# Patient Record
Sex: FEMALE | NOT HISPANIC OR LATINO | URBAN - METROPOLITAN AREA
[De-identification: names, ages, dates, MRNs, and addresses within clinical notes are randomized per-mention and may not be internally consistent; named-entity substitution may affect disease eponyms.]

---

## 2017-07-19 ENCOUNTER — OFFICE VISIT - HEALTHEAST (OUTPATIENT)
Dept: FAMILY MEDICINE | Facility: CLINIC | Age: 37
End: 2017-07-19

## 2017-07-19 DIAGNOSIS — N39.0 UTI (URINARY TRACT INFECTION): ICD-10-CM

## 2017-07-19 DIAGNOSIS — R35.0 URINARY FREQUENCY: ICD-10-CM

## 2017-07-25 ENCOUNTER — COMMUNICATION - HEALTHEAST (OUTPATIENT)
Dept: FAMILY MEDICINE | Facility: CLINIC | Age: 37
End: 2017-07-25

## 2021-03-24 ENCOUNTER — COMMUNICATION - HEALTHEAST (OUTPATIENT)
Dept: SCHEDULING | Facility: CLINIC | Age: 41
End: 2021-03-24

## 2021-03-25 ENCOUNTER — COMMUNICATION - HEALTHEAST (OUTPATIENT)
Dept: CARE COORDINATION | Facility: CLINIC | Age: 41
End: 2021-03-25

## 2021-03-25 ENCOUNTER — COMMUNICATION - HEALTHEAST (OUTPATIENT)
Dept: SCHEDULING | Facility: CLINIC | Age: 41
End: 2021-03-25

## 2021-03-26 ENCOUNTER — OFFICE VISIT - HEALTHEAST (OUTPATIENT)
Dept: INTERNAL MEDICINE | Facility: CLINIC | Age: 41
End: 2021-03-26

## 2021-03-26 DIAGNOSIS — J45.21 EXACERBATION OF INTERMITTENT ASTHMA, UNSPECIFIED ASTHMA SEVERITY: ICD-10-CM

## 2021-03-26 RX ORDER — BUDESONIDE AND FORMOTEROL FUMARATE DIHYDRATE 80; 4.5 UG/1; UG/1
2 AEROSOL RESPIRATORY (INHALATION) 2 TIMES DAILY
Qty: 1 INHALER | Refills: 1 | Status: SHIPPED | OUTPATIENT
Start: 2021-03-26

## 2021-03-26 ASSESSMENT — PATIENT HEALTH QUESTIONNAIRE - PHQ9: SUM OF ALL RESPONSES TO PHQ QUESTIONS 1-9: 0

## 2021-03-27 ENCOUNTER — COMMUNICATION - HEALTHEAST (OUTPATIENT)
Dept: CARE COORDINATION | Facility: CLINIC | Age: 41
End: 2021-03-27

## 2021-03-29 ENCOUNTER — AMBULATORY - HEALTHEAST (OUTPATIENT)
Dept: INTERNAL MEDICINE | Facility: CLINIC | Age: 41
End: 2021-03-29

## 2021-03-29 ENCOUNTER — COMMUNICATION - HEALTHEAST (OUTPATIENT)
Dept: NURSING | Facility: CLINIC | Age: 41
End: 2021-03-29

## 2021-04-01 ENCOUNTER — COMMUNICATION - HEALTHEAST (OUTPATIENT)
Dept: CARE COORDINATION | Facility: CLINIC | Age: 41
End: 2021-04-01

## 2021-04-01 ASSESSMENT — ACTIVITIES OF DAILY LIVING (ADL): DEPENDENT_IADLS:: INDEPENDENT

## 2021-04-05 ENCOUNTER — COMMUNICATION - HEALTHEAST (OUTPATIENT)
Dept: CARE COORDINATION | Facility: CLINIC | Age: 41
End: 2021-04-05

## 2021-04-09 ENCOUNTER — COMMUNICATION - HEALTHEAST (OUTPATIENT)
Dept: CARE COORDINATION | Facility: CLINIC | Age: 41
End: 2021-04-09

## 2021-04-16 ENCOUNTER — COMMUNICATION - HEALTHEAST (OUTPATIENT)
Dept: NURSING | Facility: CLINIC | Age: 41
End: 2021-04-16

## 2021-04-30 ENCOUNTER — COMMUNICATION - HEALTHEAST (OUTPATIENT)
Dept: NURSING | Facility: CLINIC | Age: 41
End: 2021-04-30

## 2021-05-11 ENCOUNTER — COMMUNICATION - HEALTHEAST (OUTPATIENT)
Dept: NURSING | Facility: CLINIC | Age: 41
End: 2021-05-11

## 2021-05-27 ASSESSMENT — PATIENT HEALTH QUESTIONNAIRE - PHQ9: SUM OF ALL RESPONSES TO PHQ QUESTIONS 1-9: 0

## 2021-05-31 VITALS — WEIGHT: 157 LBS

## 2021-06-11 NOTE — PROGRESS NOTES
Chief Complaint   Patient presents with     poss uti     Discomfort after urinating to the bladder. Frequency.        HPI    Patient is here for a week of dysuria with increased urinary frequency, with recent small gross hematuria. No fever, chills,flank pain, abdominal pain, hx of renal calculus.     ROS: Pertinent ROS noted in HPI.     No Known Allergies  There is no problem list on file for this patient.      No family history on file.    Social History     Social History     Marital status: Single     Spouse name: N/A     Number of children: N/A     Years of education: N/A     Occupational History     Not on file.     Social History Main Topics     Smoking status: Never Smoker     Smokeless tobacco: Never Used     Alcohol use Not on file     Drug use: Not on file     Sexual activity: Not on file     Other Topics Concern     Not on file     Social History Narrative     No narrative on file         Objective:    Vitals:    07/19/17 1806   BP: 114/60   Pulse: 75   Resp: 18   Temp: 98.6  F (37  C)   SpO2: 98%       Gen:NAD  Abd: normal bowel sounds, soft, no tenderness, no HSM/mass. No CVA tenderness.       Recent Results (from the past 24 hour(s))   Urinalysis-UC if Indicated   Result Value Ref Range    Color, UA Yellow Colorless, Yellow, Straw, Light Yellow    Clarity, UA Cloudy (!) Clear    Glucose, UA Negative Negative    Bilirubin, UA Negative Negative    Ketones, UA Negative Negative    Specific Gravity, UA >=1.030 1.005 - 1.030    Blood, UA Large (!) Negative    pH, UA 5.5 5.0 - 8.0    Protein,  mg/dL (!) Negative mg/dL    Urobilinogen, UA 0.2 E.U./dL 0.2 E.U./dL, 1.0 E.U./dL    Nitrite, UA Negative Negative    Leukocytes, UA Large (!) Negative    Bacteria, UA Few (!) None Seen hpf    RBC, UA 25-50 (!) None Seen, 0-2 hpf    WBC, UA  (!) None Seen, 0-5 hpf    Squam Epithel, UA 0-5 None Seen, 0-5 lpf    WBC Clumps Present (!) None Seen         UTI (urinary tract infection)  -     nitrofurantoin,  macrocrystal-monohydrate, (MACROBID) 100 MG capsule; Take 1 capsule (100 mg total) by mouth 2 (two) times a day for 7 days.    Urinary frequency  -     Urinalysis-UC if Indicated  -     Culture, Urine

## 2021-06-16 PROBLEM — J45.21 EXACERBATION OF INTERMITTENT ASTHMA: Status: ACTIVE | Noted: 2021-03-26

## 2021-06-16 NOTE — PROGRESS NOTES
Pt on [employer] who was seen in ED yesterday for wheezing and cough. COVID test negative. Call made to pt for ED f/u and pt reporting O2 sats 86% to the Loop. During call, pt needed to take incoming call; RN will call back later.    Care Coordination ED Discharge Follow up Note    ED Discharge date: 3/24/21         Reason/Diagnosis for ED visit: wheezing, cough. COVID negative         Are you feeling better, the same, or worse since your ED visit: Better. Wheezing and cough improved. Denies fever, chest tightness.                       Were you sent home with oxygen and a pulse oximeter: Yes oximeter    Are you currently utilizing the oxygen?  N/A    Are you currently utilizing the pulse oximeter: Yes    Do you understand how to utilize both: Yes  oximeter  What liter flow were you instructed to use? N/A  What liter flow are you using to maintain your oxygen saturation: N/A    What type of home oxygen system do you have (*ask about portability and ability to manage a portable oxygen delivery)?  N/A    Who is your supply company? N/A    What are your current oxygen saturations: yesterday 84-88% w/o dyspnea. Today 99%     If red flag o2 sat, escalated to:    Activity:         How much activity can you do before you are SOB? Pt denies dyspnea.    Have you had to reduce your activities because of dyspnea or other symptoms? No. Pt is working from home.         Follow up:         Do you have any follow up appointments scheduled with your PCP or a specialist? No    Who are you seeing and when is it scheduled: Clinic will call her to fit her in the [facility] schedule for tomorrow    Do you feel like you have a plan in place in the event of an emergency? Yes    Provided patient with .[id number] via "[employer]"hart or email:  No      RN Notes:  Instructed pt to increase fluid intake and to do deep breathing exercises.  Instructed pt how to do PLB and sent a handout to pt through [employer].  Pt is  requesting [facility] for financial help options. Referral placed.  Pt took the incorrect dose of prednisone today.She took 20 mg (two tabs) instead of 50 mg (5 tabs)  Instructed pt on correct dosing and to take three more tabs after the phone call.  Pt reports she has a cat allergy and has cats.

## 2021-06-16 NOTE — PROGRESS NOTES
Marivel Elder is a 40 y.o. female who is being evaluated via a billable video visit.      How would you like to obtain your AVS? MyChart.  If dropped from the video visit, the video invitation should be resent by: Text to cell phone: [phone]  Will anyone else be joining your video visit? No      Video Start Time: 2:19 PM    Assessment & Plan     Exacerbation of intermittent asthma, unspecified asthma severity  40-year-old woman with no previous diagnosis of asthma.  New patient to me, not previously seen in our office.  Appointment scheduled to follow-up recent ER visit on March 24 at which time she presented with increasing shortness of breath associated with cough.  O2 sats dropping into the 80s.  Work-up included chest x-ray showing no infiltrates.  Testing for COVID-19 and influenza was negative.  Felt to be possible asthma exacerbation secondary to viral URI and sent home with 50 mg of prednisone for 3 days along with an albuterol inhaler.  She reports partial improvement.  O2 sats now hovering around 90%.  No history of allergies previously.  Suspect URI with hyperreactive airways/asthma.  We will continue prednisone for a total of 5 days having her continue at 40 mg daily.  We will also get her a Z-Radha as I am not sure this is viral.  I would also recommend starting Symbicort 2 puffs twice daily for the next 2 weeks and she may continue to use her albuterol inhaler that was prescribed every 6 hours as needed.  I would recommend follow-up visit at our office in 3 to 4 weeks to reassess.  We will decide whether she would benefit from referral to an allergist or whether any additional work-up for asthma should be completed.  - predniSONE (DELTASONE) 10 mg tablet  Dispense: 8 tablet; Refill: 0  - azithromycin (ZITHROMAX Z-RADHA) 250 MG tablet  Dispense: 6 tablet; Refill: 0  - budesonide-formoteroL (SYMBICORT) 80-4.5 mcg/actuation inhaler  Dispense: 1 Inhaler; Refill: 1               BMI:   Estimated body  "mass index is 29.95 kg/m  as calculated from the following:    Height as of 3/24/21: 5' 5\" (1.651 m).    Weight as of 3/24/21: 180 lb (81.6 kg).       No follow-ups on file.    Hardy Langley MD  [facility]    Subjective   Marivel Elder is 40 y.o. and presents today for the following health issues   HPI 40-year-old woman with video visit completed today to follow-up after recent ER evaluation with increasing shortness of breath and cough.  Diagnosed with viral URI with asthma exacerbation.  See assessment and plan for details.            Review of Systems  Tightness in chest and wheezing      Objective       Vitals:  No vitals were obtained today due to virtual visit.    Physical Exam  Well-appearing woman in no distress            Video-Visit Details    Type of service:  Video Visit    Video End Time (time video stopped): 2:37 PM  Originating Location (pt. Location): Home    Distant Location (provider location):  [facility]     Platform used for Video Visit: Doximity  "

## 2021-06-16 NOTE — PROGRESS NOTES
Message is confusing.  Symbicort which I prescribed is listed at #6 as far as coverage medications.  Was patient able to  this inhaler last Friday?  Check with pharmacy.

## 2021-06-16 NOTE — PROGRESS NOTES
Clinic Care Coordination Contact  Community Health Worker Initial Outreach    CHW Initial Information Gathering:  Current living arrangement:: I live alone, Other(with Daughter)  Type of residence:: Private home - stairs  Supplies Currently Used at Home: None, Nebulizer tubing(is for Daughter)  Informal Support system:: Family, Friends  No PCP office visit in Past Year: No  Transportation means:: Regular car  CHW Additional Questions  MyChart active?: Yes    Patient accepts CC: Yes. Patient scheduled for assessment with RN on 4/1 at 11:00. Patient noted desire to discuss Assessment would like to work with the FRW's .     Patient has seen Dr. Langley for a Hospital follow up on 3/26 and has an appointment to Establish care on 4/30 with same provider.

## 2021-06-16 NOTE — PROGRESS NOTES
Clinic Care Coordination Contact  Program: [facility]  County:  [facility] Case #:  [facility] Worker:   Sera #:   Subscriber ID #:   Date Applied:   Renewal:       Outreach:   4/5/21: Outreach attempted x 1. Left message on voicemail with call back information and requested return call.  Plan: FRW will call again within one week.       Health Insurance:      Referral/Screening:       Goals Addressed:

## 2021-06-16 NOTE — TELEPHONE ENCOUNTER
Pt reports cough and wheezing for about a week. Pt reports she also feels short of breath when she lies down. Pt denies history of asthma.  Pt is able to speak in full sentences and no wheezing audible during phone call. Pt denies any other acute symptoms.    Advised pt to be seen within four hours per protocol.     Pt verbalizes understanding and agrees to plan.     Additional Information    [1] MILD difficulty breathing (e.g., minimal/no SOB at rest, SOB with walking, pulse <100) AND [2] NEW-onset or WORSE than normal    Protocols used: BREATHING DIFFICULTY-A-AH    COVID 19 Nurse Triage Plan/Patient Instructions    Please be aware that novel coronavirus (COVID-19) may be circulating in the community. If you develop symptoms such as fever, cough, or SOB or if you have concerns about the presence of another infection including coronavirus (COVID-19), please contact your health care provider or visit  [email or web address].    Disposition/Instructions    In-Person Visit with provider recommended. Reference Visit Selection Guide.    Thank you for taking steps to prevent the spread of this virus.  o Limit your contact with others.  o Wear a simple mask to cover your cough.  o Wash your hands well and often.    Resources    [facility]: About COVID-19: [email or web address]    CDC: What to Do If You're Sick: www.cdc.gov/coronavirus/2019-ncov/about/steps-when-sick.html    CDC: Ending Home Isolation: www.cdc.gov/coronavirus/2019-ncov/hcp/disposition-in-home-patients.html     CDC: Caring for Someone: www.cdc.gov/coronavirus/2019-ncov/if-you-are-sick/care-for-someone.html     [facility]: Interim Guidance for Hospital Discharge to Home: www.health.[facility].[place].us/diseases/coronavirus/hcp/hospdischarge.pdf    [facility] clinical trials (COVID-19 research studies): clinicalaffairs.[facility]/umn-clinical-trials     Below are the COVID-19 hotlines at the [place] Department of Health ([facility]).  Interpreters are available.   o For health questions: Call 730-312-2725 or 1-785.260.5747 (7 a.m. to 7 p.m.)  o For questions about schools and childcare: Call 966-302-8492 or 1-223.282.6074 (7 a.m. to 7 p.m.)

## 2021-06-16 NOTE — TELEPHONE ENCOUNTER
"Patient on [employer] Loop for COVID-19 with red flag alert for O2 sats < 92%; pt reported 91% in daily check-in.  Of note, pt's COVID-19 test was negative on 3/24/21.  Call made to assess.    Pt states she's feeling \"fine.\"  Reports her O2 sats tend to drop down 88% more so in the evenings, but during the day ranges 90-91% primarily with a few higher readings in between.  States initially her O2 sats were 84-88% the first night, but since then her breathing has improved.  States when her O2 sats go below 90%, she tries \"all 8 fingers\" and makes sure her hands/fingers are warm when testing, wrapping her hands in a warm towel before checking her O2 sats if her hands are cold.      Pt had a virtual follow up visit with Dr. Langley yesterday, 3/26/21 - she was prescribed azithromycin, another 2 days of prednisone (40 mg x 2), and Symbicort 2 puffs two times a day for 2 weeks, in addition to using her albuterol inhaler PRN.  Pt states she started the azithromycin yesterday, and the Symbicort inhaler this morning.  Pt states states she rinsed her mouth after taking her Symbicort inhaler and will continue this practice while taking Symbicort.  She states she has not taken her albuterol inhaler today, but did take it twice yesterday.  States her coughing has decreased, now more periodic vs constant but still there.      RN reviewed precautions and reasons to return to the ER, ie chest pain, worsening SOB.  Continue monitoring SpO2 every 4 hours, more frequently PRN.  OTC Mucinex to help thin/loosen secretions, and steam from hot showers, helps as well.  Push fluids, you need more when you're sick.  Warm fluids such as tea with lemon and honey, or even just plain hot water (as warm as you can tolerate without burning yourself) helps calm and relax the airways during coughing spasms.  You can also take 1-2 teaspoons of honey (if you don t have diabetes) to help coat the throat and soothe a cough.  Continue doing deep " breathing exercises, taking 5-10 deep breaths every one to two hours while awake to help exercise your lungs.  Advised taking her albuterol inhaler scheduled every 4-6 hours, for the next couple of days to help with her breathing and bring her O2 sats up above 90%.  Continue daily [facility] check-ins and RN will follow up with pt as needed.  The patient indicates understanding of these issues and agrees with the plan.        Rosaline Chapa RN, [facility]

## 2021-06-16 NOTE — PROGRESS NOTES
Clinic Care Coordination Contact  [facility]/Voicemail    Clinical Data: Care Coordinator Outreach  Outreach attempted x 1.  Left message on patient's voicemail with call back information and requested return call.  Plan: Care Coordinator will try to reach patient again in 10 business days.      Next CHW outreach date: 4/30/21

## 2021-06-16 NOTE — TELEPHONE ENCOUNTER
New Appointment Needed  What is the reason for the visit:    Inpatient/ED Follow Up Appt Request  At what hospital or facility were you seen[facility]   What is the reason you were seen?: low oxygen levels, coughing, wheezing  What date were you admitted?: date: 3/25/2021  What date were you discharged?: date: 3/25/2021  What was the recommended timeframe for your follow up appointment?: patient would like video appointment for 3/26/2021 after 1pm.  Provider Preference: Any available  How soon do you need to be seen?: tomorrow  Waitlist offered?: No  Okay to leave a detailed message:  Yes

## 2021-06-16 NOTE — PROGRESS NOTES
[facility] bud[facility]-form[facility]L (SYMBICORT) 80-4.5 mcg/actuation inhaler stating that his is not covered by insurance, alternatives are the following;    1.  FLUTICASONESALMETEROL  2. WIXELAINHUB  3. ADVAIRHFA  4.  BREOELLIPTA  5. DULERA  6. SYMBICORT    Please prescribe the following, unless you would like to initiate a PA for budesonide-formoteroL (SYMBICORT) 80-4.5 mcg/actuation inhaler

## 2021-06-17 NOTE — PROGRESS NOTES
Clinic Care Coordination Contact  [facility]/Voicemail    Clinical Data: Care Coordinator Outreach  Outreach attempted x 2.  Left message on patient's voicemail with call back information and requested return call.  Plan: Care Coordinator will rount patients chart to CCC RN to review for disenrollment.

## 2021-06-21 NOTE — LETTER
Letter by Kaya Madison RN at      Author: Kaya Madison RN Service: -- Author Type: --    Filed:  Encounter Date: 4/1/2021 Status: (Other)       CARE COORDINATION    April 1, 2021    Marivel Elder  [address] [place]  [place] [facility] [address]      Dear Marivel,    I am a clinic care coordinator who works at [facility]. I wanted to thank you for spending the time to talk with me.  Below is a description of clinic care coordination and how I can further assist you.      The clinic care coordination team is made up of a registered nurse,  and community health worker who understand the health care system. The goal of clinic care coordination is to help you manage your health and improve access to the health care system in the most efficient manner. The team can assist you in meeting your health care goals by providing education, coordinating services, strengthening the communication among your providers and supporting you with any resource needs.    Please feel free to contact the Community Health Worker Lynn Johnson at [phone] with any questions or concerns. We are focused on providing you with the highest-quality healthcare experience possible and that all starts with you.     Sincerely,     Kaya Madison RN  Clinic Care Coordination    Enclosed: I have enclosed a copy of the Care Plan. This has helpful information and goals that we have talked about. Please keep this in an easy to access place to use as needed.

## 2021-06-21 NOTE — LETTER
Letter by Kaya Madison RN at      Author: Kaya Madison RN Service: -- Author Type: --    Filed:  Encounter Date: 4/1/2021 Status: (Other)       Care Plan  About Me:    Patient Name:  Marivel Elder    YOB: 1980  Age:         40 y.o.   [facility] MRN:    [id number] Telephone Information:  Home Phone [phone]   Mobile [phone]       Address:  [address] [place] [address] Email address:  [email or web address]      Emergency Contact(s)  Extended Emergency Contact Information      Name: Isabelle Fitzpartick    Home Phone Number: [phone]  Relation: Mother          Primary language:  English     needed? No   [facility] Language Services:  [phone] op. 1  Other communication barriers: None  Preferred Method of Communication:     Current living arrangement: I live alone, Other(with Daughter)  Mobility Status/ Medical Equipment: Independent    Health Maintenance  Health Maintenance Reviewed:      My Access Plan  Medical Emergency 911   Primary Clinic Line No Primary Care Provider - [phone]   24 Hour Appointment Line [phone] or  [phone]) (toll-free)   24 Hour Nurse Line [phone] (toll-free)   Preferred Urgent Care     Preferred Hospital     Preferred Pharmacy [id number] [address]  AT [facility]     Behavioral Health Crisis Line The National Suicide Prevention Lifeline at 1-520.540.7086 or 911             My Care Team Members  Patient Care Team       Relationship Specialty Notifications Start End    Provider, No Primary Care PCP - General   7/19/17     Phone: [phone]         Kaya Madison, RN Lead Care Coordinator Primary Care - [facility] Admissions 4/1/21     Fax: [phone]         Lynn Johnson CHW Community Health Worker Primary Care - [facility] Admissions 4/1/21     Phone: [phone] Fax: [phone]        Arminda Her Financial Resource Worker Primary Care - [facility]   4/1/21     Phone: [phone] Fax: [phone]                My Care Plans  Self Management and Treatment Plan  Goals and (Comments)  Goals        General    Financial Wellbeing (pt-stated)     Notes - Note created  4/1/2021  2:24 PM by Kaya Madison, RN    Goal Statement: I would like to apply for SNAP/Cash or other financial resources in the next 30-60 days.  Date Goal set: 4/1/21  Barriers: resources  Strengths: motivated  Date to Achieve By: 30-60 days  Patient expressed understanding of goal: yes  Action steps to achieve this goal:  1. I will speak with the [facility] Financial Resource Worker at outreach telephone calls.  2. I will return any necessary paperwork and/or documentation in a timely manner.         Other (pt-stated)     Notes - Note edited  4/1/2021  2:29 PM by Kaya Madison, RN    Goal Statement: I would like to Establish Care with Dr. Langley at the [facility] in the next 30 days.  Date Goal set: 4/1/21  Barriers: without a primary physician  Strengths: motivated  Date to Achieve By: 30 days  Patient expressed understanding of goal: yes  Action steps to achieve this goal:  1. I will attend my Establish Care appointment 4/30/21 @ 4:20 pm                   Advance Care Plans/Directives Type:        My Medical and Care Information  Problem List   Patient Active Problem List   Diagnosis   ? Exacerbation of intermittent asthma      Current Medications and Allergies:  See printed Medication Report.    Care Coordination Start Date: 4/1/2021   Frequency of Care Coordination: monthly   Form Last Updated: 04/01/2021

## 2021-06-21 NOTE — LETTER
Letter by Lynn Johnson CHW at      Author: Lynn Johnson CHW Service: -- Author Type: --    Filed:  Encounter Date: 5/11/2021 Status: (Other)       CARE COORDINATION  [facility]    May 11, 2021    Marivel Elder  [address]  [facility] [address]      Dear Marivel,  I have been unsuccessful in reaching you since our last contact. At this time the Care Coordination team will make no further attempts to reach you, however this does not change your ability to continue receiving care from your providers at your primary care clinic. If you need additional support from a care coordinator in the future please contact Lynn Johnson at [phone].    All of us at [facility] are invested in your health and are here to assist you in meeting your goals.     Sincerely,    ARYA Alexandre  [facility] Care Coordination   [phone]  [email or web address]

## 2021-06-27 ENCOUNTER — HEALTH MAINTENANCE LETTER (OUTPATIENT)
Age: 41
End: 2021-06-27

## 2021-06-30 NOTE — PROGRESS NOTES
Progress Notes by Kaya Madison RN at 4/1/2021 11:00 AM     Author: Kaya Madison RN Service: -- Author Type: Registered Nurse    Filed: 4/1/2021  2:42 PM Encounter Date: 4/1/2021 Status: Signed    : Kaya Madison RN (Registered Nurse)       Clinic Care Coordination Contact    Clinic Care Coordination Contact  OUTREACH    Referral Information:       Primary Diagnosis: Respiratory Disorders - other    Chief Complaint   Patient presents with   ? Clinic Care Coordination - Initial        Clinic Utilization  Difficulty keeping appointments:: No  Compliance Concerns: No  No-Show Concerns: No  No PCP office visit in Past Year: Yes  Utilization    Last refreshed: 4/1/2021  1:52 PM: Hospital Admissions 0           Last refreshed: 4/1/2021  1:52 PM: ED Visits 1           Last refreshed: 4/1/2021  1:52 PM: No Show Count (past year) 0              Current as of: 4/1/2021  1:52 PM              Clinical Concerns:  Current Medical Concerns:  asthma  Current Behavioral Concerns:     Education Provided to patient: yes   Pain  Pain (GOAL):: No  Health Maintenance Reviewed:    Clinical Pathway: None     Medication Management:  Completed z-pac.  Has nebulizer and inhalers in the home.  Knowledgeable how to use both correctly.     Functional Status:  Dependent ADLs:: Independent  Dependent IADLs:: Independent  Bed or wheelchair confined:: No  Mobility Status: Independent  Fallen 2 or more times in the past year?: No  Any fall with injury in the past year?: No    Living Situation:  Current living arrangement:: I live alone, Other(with Daughter)  Type of residence:: Private home - stairs    Lifestyle & Psychosocial Needs:        Diet:: Regular  Inadequate nutrition (GOAL):: No  Tube Feeding: No  Inadequate activity/exercise (GOAL):: No  Significant changes in sleep pattern (GOAL): No  Transportation means:: Regular car     Muslim or spiritual beliefs that impact treatment:: No  Mental health DX:: No  Mental health  "management concern (GOAL):: No  Chemical Dependency Status: Not Applicable  Informal Support system:: Family, Friends   Socioeconomic History   ? Marital status: Single     Spouse name: Not on file   ? Number of children: Not on file   ? Years of education: Not on file   ? Highest education level: Not on file     Tobacco Use   ? Smoking status: Never Smoker   ? Smokeless tobacco: Never Used                Resources and Interventions:  Current Resources:      Community Resources: None  Supplies Currently Used at Home: None, Nebulizer tubing(is for Daughter)  Equipment Currently Used at Home: none             Referrals Placed: None     40 yr F PMH: asthma.  ED 3/24 for shortness of breath, and upper resp tract infection.  Enrolled to Get Well Loop although Covid negative.  Has been completing the Loop on a regular basis per patient report.  Has completed the Z-Pac and now is \"feeling much better\".  Still has the inhalers and nebulizer's \"if needed\".  Plans to Establish Care with Dr. Langley 4/30/21.  Patient wanted to discuss SNAP/cash, etc.  FRW screening tool completed. [place] was unclear what additional resources she was looking for.  Did not schedule her with [facility] as she was 'not sure' what resources she was in need of.  Patient Enrolled for Goals as listed below.     Goals:   Goals        General    Financial Wellbeing (pt-stated)     Notes - Note created  4/1/2021  2:24 PM by Kaya Madison RN    Goal Statement: I would like to apply for SNAP/Cash or other financial resources in the next 30-60 days.  Date Goal set: 4/1/21  Barriers: resources  Strengths: motivated  Date to Achieve By: 30-60 days  Patient expressed understanding of goal: yes  Action steps to achieve this goal:  1. I will speak with the [facility] Financial Resource Worker at outreach telephone calls.  2. I will return any necessary paperwork and/or documentation in a timely manner.         Other (pt-stated)     Notes - Note edited  4/1/2021  2:29 " PM by Kaya Madison RN    Goal Statement: I would like to Establish Care with Dr. Langley at the [facility] in the next 30 days.  Date Goal set: 4/1/21  Barriers: without a primary physician  Strengths: motivated  Date to Achieve By: 30 days  Patient expressed understanding of goal: yes  Action steps to achieve this goal:  1. I will attend my Establish Care appointment 4/30/21 @ 4:20 pm                Patient/Caregiver understanding: Patient stated an understanding    Outreach Frequency: monthly  Future Appointments              In 4 weeks Hardy Langley MD [facility]          Plan: DELEGATION: NONE

## 2021-06-30 NOTE — PROGRESS NOTES
Progress Notes by Lynn Johnson CHW at 5/11/2021  8:12 AM     Author: Lynn Johnson CHW Service: -- Author Type: Community Health Worker    Filed: 5/11/2021  8:16 AM Encounter Date: 5/11/2021 Status: Signed    : Lynn Johnson CHW (Community Health Worker)       Clinic Care Coordination Contact  [facility]/Vo[employer]    Plan: Care Coordinator will send disenrollment letter with care coordinator contact information via [employer]. Care Coordinator will do no further outreaches at this time.      Kaya Madison, RN  You 8 days ago     Patient's goals were related to applying for medical insurance and establishing care with PCP.  Patient No showed FRW and it is unclear if she attended the PCP appointment on 4/30.  Okay to send an unable to contact letter.    Message text       You  Kaya Madison, RN 11 days ago     CHW has called patient x2 with no return call. Please advise for unenrollment

## 2021-10-17 ENCOUNTER — HEALTH MAINTENANCE LETTER (OUTPATIENT)
Age: 41
End: 2021-10-17

## 2022-07-23 ENCOUNTER — HEALTH MAINTENANCE LETTER (OUTPATIENT)
Age: 42
End: 2022-07-23

## 2022-10-01 ENCOUNTER — HEALTH MAINTENANCE LETTER (OUTPATIENT)
Age: 42
End: 2022-10-01

## 2023-08-12 ENCOUNTER — HEALTH MAINTENANCE LETTER (OUTPATIENT)
Age: 43
End: 2023-08-12

## 2024-03-03 ENCOUNTER — HEALTH MAINTENANCE LETTER (OUTPATIENT)
Age: 44
End: 2024-03-03